# Patient Record
Sex: MALE | Employment: FULL TIME | URBAN - METROPOLITAN AREA
[De-identification: names, ages, dates, MRNs, and addresses within clinical notes are randomized per-mention and may not be internally consistent; named-entity substitution may affect disease eponyms.]

---

## 2018-06-18 ENCOUNTER — APPOINTMENT (OUTPATIENT)
Dept: GENERAL RADIOLOGY | Age: 48
End: 2018-06-18
Attending: FAMILY MEDICINE
Payer: COMMERCIAL

## 2018-06-18 ENCOUNTER — HOSPITAL ENCOUNTER (OUTPATIENT)
Age: 48
Discharge: HOME | End: 2018-06-18
Attending: FAMILY MEDICINE
Payer: COMMERCIAL

## 2018-06-18 VITALS
RESPIRATION RATE: 18 BRPM | TEMPERATURE: 98 F | WEIGHT: 205 LBS | DIASTOLIC BLOOD PRESSURE: 87 MMHG | OXYGEN SATURATION: 100 % | HEART RATE: 90 BPM | SYSTOLIC BLOOD PRESSURE: 102 MMHG

## 2018-06-18 DIAGNOSIS — S81.811A LACERATION OF RIGHT LOWER EXTREMITY, INITIAL ENCOUNTER: Primary | ICD-10-CM

## 2018-06-18 PROCEDURE — 99205 OFFICE O/P NEW HI 60 MIN: CPT

## 2018-06-18 PROCEDURE — 12034 INTMD RPR S/TR/EXT 7.6-12.5: CPT

## 2018-06-18 PROCEDURE — 73590 X-RAY EXAM OF LOWER LEG: CPT | Performed by: FAMILY MEDICINE

## 2018-06-18 PROCEDURE — 99203 OFFICE O/P NEW LOW 30 MIN: CPT

## 2018-06-18 RX ORDER — CEPHALEXIN 500 MG/1
500 CAPSULE ORAL 3 TIMES DAILY
Qty: 21 CAPSULE | Refills: 0 | Status: SHIPPED | OUTPATIENT
Start: 2018-06-18 | End: 2018-06-20

## 2018-06-18 NOTE — ED PROVIDER NOTES
Patient Seen in: [address] [facility]    History   Patient presents with:  Laceration Abrasion (integumentary): r leg    Stated Complaint: LACERATION    HPI    47-year-old male presents for evaluation of laceration to his right calf Right mid calf laterally has a V-shaped full-thickness laceration with ragged edges involving the subcutaneous fat tissue, exposing the fascia. Approximate size of the laceration measures 12 cm.  There is laceration, gaping of the wound and the subcutaneou CONCLUSION:  No acute fracture is seen. No evidence of dislocation. There is a soft tissue defect along the right lateral calf. Please note that some foreign bodies may not be visualized with radiographs.   If this is of persistent clinical concern then

## 2018-06-20 ENCOUNTER — HOSPITAL ENCOUNTER (OUTPATIENT)
Age: 48
Discharge: OTHER INSTITUTION | End: 2018-06-20
Attending: FAMILY MEDICINE
Payer: COMMERCIAL

## 2018-06-20 ENCOUNTER — APPOINTMENT (OUTPATIENT)
Dept: CT IMAGING | Facility: HOSPITAL | Age: 48
End: 2018-06-20
Attending: FAMILY MEDICINE
Payer: COMMERCIAL

## 2018-06-20 VITALS
TEMPERATURE: 100 F | SYSTOLIC BLOOD PRESSURE: 134 MMHG | OXYGEN SATURATION: 95 % | HEART RATE: 90 BPM | DIASTOLIC BLOOD PRESSURE: 92 MMHG | WEIGHT: 200 LBS | BODY MASS INDEX: 31.39 KG/M2 | HEIGHT: 67 IN | RESPIRATION RATE: 18 BRPM

## 2018-06-20 DIAGNOSIS — S81.811D LEG LACERATION, RIGHT, SUBSEQUENT ENCOUNTER: Primary | ICD-10-CM

## 2018-06-20 PROCEDURE — 99213 OFFICE O/P EST LOW 20 MIN: CPT

## 2018-06-20 PROCEDURE — 36415 COLL VENOUS BLD VENIPUNCTURE: CPT

## 2018-06-20 PROCEDURE — 73701 CT LOWER EXTREMITY W/DYE: CPT | Performed by: FAMILY MEDICINE

## 2018-06-20 PROCEDURE — 80047 BASIC METABLC PNL IONIZED CA: CPT

## 2018-06-20 PROCEDURE — 85025 COMPLETE CBC W/AUTO DIFF WBC: CPT | Performed by: FAMILY MEDICINE

## 2018-06-20 PROCEDURE — 99214 OFFICE O/P EST MOD 30 MIN: CPT

## 2018-06-20 RX ORDER — CLINDAMYCIN HYDROCHLORIDE 300 MG/1
300 CAPSULE ORAL 3 TIMES DAILY
Qty: 30 CAPSULE | Refills: 0 | Status: SHIPPED | OUTPATIENT
Start: 2018-06-20 | End: 2018-06-30

## 2018-06-20 RX ORDER — CLINDAMYCIN PHOSPHATE 600 MG/50ML
600 INJECTION INTRAVENOUS ONCE
Status: DISCONTINUED | OUTPATIENT
Start: 2018-06-20 | End: 2018-06-20

## 2018-06-20 NOTE — ED NOTES
Pt right leg wound noted to have staples intact, redness w swelling noted above wound. Pt denies pain, cms intact.

## 2018-06-20 NOTE — ED PROVIDER NOTES
Patient Seen in: [address] [facility]    History   Patient presents with:  Laceration Abrasion (integumentary)    Stated Complaint: wound check    HPI    78-year-old male who returns to the immediate care today for wound check for a Heart: S1, S2 normal, no murmur, click, rub or gallop, regular rate and rhythm  Abdomen: soft, non-tender; bowel sounds normal; no masses,  no organomegaly  Extremities: extremities normal, atraumatic, no cyanosis or edema  Pulses: 2+ and symmetric  Skin: PROCEDURE:  CT LOWER EXT RIGHT (W IV) EM (CPT=73701)  COMPARISON:  None. INDICATIONS:  R leg laceration 2 days ago from a tree branch. wound was sutures and stapled. initial XR neg for FB. Now appears infected.  R/o foreign body  TECHNIQUE:  After obtainin CT  right lower extremity will be ordered to rule out any underlying cellulitis, foreign body, abscess. Patient will be sent over to THE [facility] for an outpatient stat CT scan.   CT right lower extremity with IV contrast was negative for an abscess or foreign b

## 2018-06-22 ENCOUNTER — HOSPITAL ENCOUNTER (OUTPATIENT)
Age: 48
Discharge: HOME | End: 2018-06-22
Attending: FAMILY MEDICINE
Payer: COMMERCIAL

## 2018-06-22 VITALS
SYSTOLIC BLOOD PRESSURE: 122 MMHG | RESPIRATION RATE: 16 BRPM | HEART RATE: 88 BPM | DIASTOLIC BLOOD PRESSURE: 82 MMHG | TEMPERATURE: 98 F | OXYGEN SATURATION: 97 %

## 2018-06-22 DIAGNOSIS — Z51.89 ENCOUNTER FOR WOUND RE-CHECK: Primary | ICD-10-CM

## 2018-06-22 DIAGNOSIS — L03.115 CELLULITIS OF RIGHT LOWER EXTREMITY: ICD-10-CM

## 2018-06-22 PROCEDURE — 99211 OFF/OP EST MAY X REQ PHY/QHP: CPT

## 2018-06-22 NOTE — ED INITIAL ASSESSMENT (HPI)
Here for wound check from laceration on 6/18/18. Staples intact, wound red. Denies fevers. Currently taking Clindamycin.

## 2018-06-22 NOTE — ED PROVIDER NOTES
Patient Seen in: [address] [facility]    History   Patient presents with:  Laceration Abrasion (integumentary)    Stated Complaint: follow up x4 days    HPI    Patient status post laceration to the right lower extremity on June 18. Disposition and Plan     Clinical Impression:  Encounter for wound re-check  (primary encounter diagnosis)  Cellulitis of right lower extremity    Disposition:  Discharge  6/22/2018 12:32 pm    Follow-up:  [facility][1]Abhinav

## 2018-07-02 ENCOUNTER — HOSPITAL ENCOUNTER (OUTPATIENT)
Age: 48
Discharge: HOME | End: 2018-07-02
Attending: FAMILY MEDICINE
Payer: COMMERCIAL

## 2018-07-02 VITALS
SYSTOLIC BLOOD PRESSURE: 133 MMHG | RESPIRATION RATE: 18 BRPM | BODY MASS INDEX: 31 KG/M2 | DIASTOLIC BLOOD PRESSURE: 93 MMHG | WEIGHT: 199.94 LBS | TEMPERATURE: 98 F | HEART RATE: 90 BPM | OXYGEN SATURATION: 95 %

## 2018-07-02 DIAGNOSIS — Z48.02 ENCOUNTER FOR STAPLE REMOVAL: Primary | ICD-10-CM

## 2018-07-02 NOTE — ED PROVIDER NOTES
Patient Seen in: [address] [facility]    History   Patient presents with:  Jermain Costello (ingtegumentary): R leg    Stated Complaint: Suture removal Right Leg    HPI    68-year-old male returns to immediate care for staple and removal.  Wound is clean, dry, intact. No fluctuance or signs of infection.   Wound dry and return to immediate care for any complications      Disposition and Plan     Clinical Impression:  Encounter for staple removal  (primary encounter diagnosis)    Nena Alberto

## 2018-07-02 NOTE — ED INITIAL ASSESSMENT (HPI)
Seen here 6/18 for r leg lac, here today for staple removal,  No drainage from site, staples intact, pt denies pain.

## (undated) NOTE — LETTER
Date & Time: 6/22/2018, 12:42 PM  Patient: Anna Reddy  Encounter Provider(s):    Mic Whitfield MD       To Whom It May Concern:    Anna Reddy was seen and treated in our department on 6/22/2018.  He can return to work with these limitatio